# Patient Record
Sex: MALE | Race: WHITE | NOT HISPANIC OR LATINO | Employment: OTHER | URBAN - METROPOLITAN AREA
[De-identification: names, ages, dates, MRNs, and addresses within clinical notes are randomized per-mention and may not be internally consistent; named-entity substitution may affect disease eponyms.]

---

## 2017-07-12 PROBLEM — S72.002A LEFT DISPLACED FEMORAL NECK FRACTURE: Status: ACTIVE | Noted: 2017-07-12

## 2017-07-13 PROBLEM — Z91.89 H/O DIFFICULT INTUBATION: Status: ACTIVE | Noted: 2017-07-13

## 2017-07-13 PROBLEM — E11.9 TYPE 2 DIABETES MELLITUS WITHOUT COMPLICATION: Status: ACTIVE | Noted: 2017-07-13

## 2017-07-13 PROBLEM — I25.810 CORONARY ARTERY DISEASE INVOLVING CORONARY BYPASS GRAFT OF NATIVE HEART: Status: ACTIVE | Noted: 2017-07-13

## 2017-07-13 PROBLEM — J69.0 ASPIRATION PNEUMONIA OF BOTH LOWER LOBES: Status: ACTIVE | Noted: 2017-07-13

## 2017-07-13 PROBLEM — J18.9 PNEUMONIA OF BOTH LOWER LOBES DUE TO INFECTIOUS ORGANISM: Status: ACTIVE | Noted: 2017-07-13

## 2017-07-13 PROBLEM — J96.01 ACUTE HYPOXEMIC RESPIRATORY FAILURE: Status: ACTIVE | Noted: 2017-07-13

## 2017-07-14 PROBLEM — R68.89 INADEQUATE ENTERAL NUTRITION INFUSION: Status: ACTIVE | Noted: 2017-07-14

## 2017-07-16 PROBLEM — J69.0 ASPIRATION PNEUMONIA OF BOTH LOWER LOBES: Status: RESOLVED | Noted: 2017-07-13 | Resolved: 2017-07-16

## 2017-07-16 PROBLEM — J96.01 ACUTE HYPOXEMIC RESPIRATORY FAILURE: Status: RESOLVED | Noted: 2017-07-13 | Resolved: 2017-07-16

## 2017-07-16 PROBLEM — J96.01 ACUTE HYPOXEMIC RESPIRATORY FAILURE: Status: RESOLVED | Noted: 2017-07-16 | Resolved: 2017-07-16

## 2017-07-16 PROBLEM — J69.0 ASPIRATION PNEUMONIA OF BOTH LOWER LOBES: Status: RESOLVED | Noted: 2017-07-16 | Resolved: 2017-07-16

## 2017-07-17 PROBLEM — J69.0 ASPIRATION PNEUMONIA: Status: ACTIVE | Noted: 2017-07-16

## 2017-11-16 PROBLEM — N40.0 BPH (BENIGN PROSTATIC HYPERPLASIA): Status: ACTIVE | Noted: 2017-11-16

## 2017-11-16 PROBLEM — N35.919 URETHRAL STRICTURE: Status: ACTIVE | Noted: 2017-11-16

## 2017-11-16 PROBLEM — R31.29 MICROHEMATURIA: Status: ACTIVE | Noted: 2017-11-16

## 2018-01-04 ENCOUNTER — OFFICE VISIT (OUTPATIENT)
Dept: WOUND CARE | Facility: HOSPITAL | Age: 83
End: 2018-01-04
Attending: SURGERY
Payer: MEDICARE

## 2018-01-04 ENCOUNTER — HOSPITAL ENCOUNTER (OUTPATIENT)
Dept: RADIOLOGY | Facility: HOSPITAL | Age: 83
Discharge: HOME | End: 2018-01-04
Attending: SURGERY
Payer: MEDICARE

## 2018-01-04 VITALS
HEART RATE: 68 BPM | SYSTOLIC BLOOD PRESSURE: 136 MMHG | RESPIRATION RATE: 20 BRPM | DIASTOLIC BLOOD PRESSURE: 68 MMHG | TEMPERATURE: 98 F

## 2018-01-04 DIAGNOSIS — L98.499 ARTERIAL INSUFFICIENCY WITH ISCHEMIC ULCER: ICD-10-CM

## 2018-01-04 DIAGNOSIS — I77.1 ARTERIAL INSUFFICIENCY WITH ISCHEMIC ULCER: ICD-10-CM

## 2018-01-04 DIAGNOSIS — E11.43 TYPE II DIABETES MELLITUS WITH PERIPHERAL AUTONOMIC NEUROPATHY: Primary | ICD-10-CM

## 2018-01-04 LAB
ALBUMIN SERPL BCP-MCNC: 3.2 G/DL
ALP SERPL-CCNC: 141 U/L
ALT SERPL W/O P-5'-P-CCNC: 22 U/L
ANION GAP SERPL CALC-SCNC: 9 MMOL/L
AST SERPL-CCNC: 22 U/L
BASOPHILS # BLD AUTO: 0.01 K/UL
BASOPHILS NFR BLD: 0.1 %
BILIRUB SERPL-MCNC: 0.6 MG/DL
BUN SERPL-MCNC: 19 MG/DL
CALCIUM SERPL-MCNC: 9.7 MG/DL
CHLORIDE SERPL-SCNC: 101 MMOL/L
CO2 SERPL-SCNC: 29 MMOL/L
CREAT SERPL-MCNC: 1.2 MG/DL
DIFFERENTIAL METHOD: ABNORMAL
EOSINOPHIL # BLD AUTO: 0.1 K/UL
EOSINOPHIL NFR BLD: 1.8 %
ERYTHROCYTE [DISTWIDTH] IN BLOOD BY AUTOMATED COUNT: 12.7 %
ERYTHROCYTE [SEDIMENTATION RATE] IN BLOOD BY WESTERGREN METHOD: 64 MM/HR
EST. GFR  (AFRICAN AMERICAN): >60 ML/MIN/1.73 M^2
EST. GFR  (NON AFRICAN AMERICAN): 55 ML/MIN/1.73 M^2
ESTIMATED AVG GLUCOSE: 197 MG/DL
GLUCOSE SERPL-MCNC: 255 MG/DL
HBA1C MFR BLD HPLC: 8.5 %
HCT VFR BLD AUTO: 37.8 %
HGB BLD-MCNC: 12.4 G/DL
LYMPHOCYTES # BLD AUTO: 2 K/UL
LYMPHOCYTES NFR BLD: 26.4 %
MCH RBC QN AUTO: 31.7 PG
MCHC RBC AUTO-ENTMCNC: 32.8 G/DL
MCV RBC AUTO: 97 FL
MONOCYTES # BLD AUTO: 0.6 K/UL
MONOCYTES NFR BLD: 7.2 %
NEUTROPHILS # BLD AUTO: 5 K/UL
NEUTROPHILS NFR BLD: 64.5 %
PLATELET # BLD AUTO: 263 K/UL
PMV BLD AUTO: 9.8 FL
POTASSIUM SERPL-SCNC: 5.1 MMOL/L
PROT SERPL-MCNC: 7.1 G/DL
RBC # BLD AUTO: 3.91 M/UL
SODIUM SERPL-SCNC: 139 MMOL/L
WBC # BLD AUTO: 7.68 K/UL

## 2018-01-04 PROCEDURE — 85651 RBC SED RATE NONAUTOMATED: CPT

## 2018-01-04 PROCEDURE — 80053 COMPREHEN METABOLIC PANEL: CPT

## 2018-01-04 PROCEDURE — 73630 X-RAY EXAM OF FOOT: CPT | Mod: TC,LT

## 2018-01-04 PROCEDURE — 87077 CULTURE AEROBIC IDENTIFY: CPT

## 2018-01-04 PROCEDURE — 11042 DBRDMT SUBQ TIS 1ST 20SQCM/<: CPT

## 2018-01-04 PROCEDURE — 27201912 HC WOUND CARE DEBRIDEMENT SUPPLIES

## 2018-01-04 PROCEDURE — 87176 TISSUE HOMOGENIZATION CULTR: CPT

## 2018-01-04 PROCEDURE — 85025 COMPLETE CBC W/AUTO DIFF WBC: CPT

## 2018-01-04 PROCEDURE — 88305 TISSUE EXAM BY PATHOLOGIST: CPT | Performed by: PATHOLOGY

## 2018-01-04 PROCEDURE — 88305 TISSUE EXAM BY PATHOLOGIST: CPT | Mod: 26,,, | Performed by: PATHOLOGY

## 2018-01-04 PROCEDURE — 87070 CULTURE OTHR SPECIMN AEROBIC: CPT

## 2018-01-04 PROCEDURE — 83036 HEMOGLOBIN GLYCOSYLATED A1C: CPT

## 2018-01-04 PROCEDURE — 87186 SC STD MICRODIL/AGAR DIL: CPT

## 2018-01-04 PROCEDURE — 99212 OFFICE O/P EST SF 10 MIN: CPT | Mod: 25

## 2018-01-04 PROCEDURE — 84134 ASSAY OF PREALBUMIN: CPT

## 2018-01-04 PROCEDURE — 73630 X-RAY EXAM OF FOOT: CPT | Mod: 26,LT,, | Performed by: RADIOLOGY

## 2018-01-04 PROCEDURE — 11044 DBRDMT BONE 1ST 20 SQ CM/<: CPT

## 2018-01-04 PROCEDURE — 88304 TISSUE EXAM BY PATHOLOGIST: CPT | Mod: 26,,, | Performed by: PATHOLOGY

## 2018-01-04 NOTE — PROGRESS NOTES
[facility]  Wound Care  History and Physical    Problem List Items Addressed This Visit     Arterial insufficiency with ischemic ulcer    Overview     HPI: 85-year-old male with a one-month history of ulcers on his left first second third toes as well as a wound on his heel on the left.  Patient has history of coronary artery disease with bypass in the early 90s.  Also has minimal carotid stenosis on ultrasound.  He sees a cardiologist at [facility].  He does not have palpable pulses in his feet.  He will be referred back to [facility] for arterial studies of his extremities as I believe that this is going to be arterial.  These could also be diabetic in nature as he is an insulin-dependent diabetic.    Location: Left first second third toes and heel    Duration: One month    Context: Arterial and or diabetic    Associated Signs & Symptoms: Wounds    Timing:     Severity: None as patient is neuropathic    Quality:     Modifying Factors: Diabetes, hypertension, arterial disease                   History:    Past Medical History:   Diagnosis Date    Acute kidney failure     Anticoagulant long-term use     BPH (benign prostatic hyperplasia)     Cancer     SKIN CANCER, FACIAL MELANOMA    Carotid artery stenosis     Coronary artery disease     Diabetes mellitus     BS      Encounter for blood transfusion     High cholesterol     [place] (hard of hearing)     Hypertension     Microhematuria     Neuropathy     Osteoarthritis     Pneumonia 07/2017    PVCs (premature ventricular contractions)     Shingles        Past Surgical History:   Procedure Laterality Date    APPENDECTOMY      CATARACT EXTRACTION W/ INTRAOCULAR LENS IMPLANT      CHOLECYSTECTOMY      CORONARY ARTERY BYPASS GRAFT      CYSTOSCOPY      MULTI    HERNIA REPAIR      JOINT REPLACEMENT Bilateral     hip    TONSILLECTOMY         Family History   Problem Relation Age of Onset    No Known Problems Mother     Diabetes Father      Heart disease Father         reports that he has never smoked. He has never used smokeless tobacco. He reports that he does not drink alcohol or use drugs.    has a current medication list which includes the following prescription(s): acebutolol, amlodipine, atorvastatin, clopidogrel, finasteride, folic acid/multivit-min/lutein, gabapentin, insulin glargine, and tamsulosin.    Allergies:  Adhesive    Review of Systems:  Review of Systems   Skin:        Ulcers to left first second third toes and heel   All other systems reviewed and are negative.        Vitals:    01/04/18 0824   BP: 136/68   Pulse: 68   Resp: 20   Temp: 97.7 °F (36.5 °C)   TempSrc: Temporal         BMI:  There is no height or weight on file to calculate BMI.    Physical Exam:  Physical Exam   Constitutional: He is oriented to person, place, and time. He appears well-developed and well-nourished.   HENT:   Head: Normocephalic.   Eyes: Pupils are equal, round, and reactive to light.   Neck: Normal range of motion.   Pulmonary/Chest: Effort normal.   Abdominal: Soft.   Musculoskeletal: Normal range of motion.   Neurological: He is alert and oriented to person, place, and time.   Skin:   See wound care progress Notes.  And pictures  Pt with left 2nd toe bone exposed c/w osteomyelitis   Psychiatric: He has a normal mood and affect.       A1C:  No results for input(s): HGBA1C in the last 2160 hours.  BMP:    Recent Labs  Lab Result Units 11/16/17  1204   Glucose mg/dL 257*   Sodium mmol/L 138   Potassium mmol/L 5.3*   Chloride mmol/L 99   CO2 mmol/L 32*   BUN, Bld mg/dL 26*   Creatinine mg/dL 1.00   Calcium mg/dL 9.5      CBC:    Recent Labs  Lab Result Units 11/16/17  1204   WBC K/uL 6.20   RBC M/uL 3.93*   Hemoglobin g/dL 12.7*   Hematocrit % 38.0*   Platelets K/uL 164   MCV fL 97   MCH pg 32.3*   MCHC g/dL 33.4     CMP:    Recent Labs  Lab Result Units 11/16/17  1204   Glucose mg/dL 257*   Calcium mg/dL 9.5   Sodium mmol/L 138   Potassium mmol/L 5.3*    CO2 mmol/L 32*   Chloride mmol/L 99   BUN, Bld mg/dL 26*     PREALBUMIN:  No results for input(s): PREALBUMIN in the last 2160 hours.  WOUND CULTURES:  No results for input(s): LABAERO in the last 2160 hours.        Plan:  See Wound Docs note for plan and follow up.        Rickey Lamas Jr  [facility]

## 2018-01-04 NOTE — PROGRESS NOTES
[facility]  Wound Care  Progress Note    Problem List Items Addressed This Visit     Arterial insufficiency with ischemic ulcer    Overview     HPI: 85-year-old male with a one-month history of ulcers on his left first second third toes as well as a wound on his heel on the left.  Patient has history of coronary artery disease with bypass in the early 90s.  Also has minimal carotid stenosis on ultrasound.  He sees a cardiologist at [facility].  He does not have palpable pulses in his feet.  He will be referred back to [facility] for arterial studies of his extremities as I believe that this is going to be arterial.  These could also be diabetic in nature as he is an insulin-dependent diabetic.    Location: Left first second third toes and heel    Duration: One month    Context: Arterial and or diabetic    Associated Signs & Symptoms: Wounds    Timing:     Severity: None as patient is neuropathic    Quality:     Modifying Factors: Diabetes, hypertension, arterial disease                 See wound doc progress notes. Documents will be scanned.        Rickey Lamas Jr  [facility]

## 2018-01-05 LAB — PREALB SERPL-MCNC: 15 MG/DL

## 2018-01-08 LAB — BACTERIA SPEC AEROBE CULT: NORMAL

## 2018-01-11 ENCOUNTER — OFFICE VISIT (OUTPATIENT)
Dept: WOUND CARE | Facility: HOSPITAL | Age: 83
End: 2018-01-11
Attending: SURGERY
Payer: MEDICARE

## 2018-01-11 VITALS
SYSTOLIC BLOOD PRESSURE: 114 MMHG | TEMPERATURE: 97 F | DIASTOLIC BLOOD PRESSURE: 60 MMHG | HEART RATE: 65 BPM | RESPIRATION RATE: 20 BRPM

## 2018-01-11 DIAGNOSIS — E11.621 DIABETIC ULCER OF TOE OF LEFT FOOT ASSOCIATED WITH TYPE 2 DIABETES MELLITUS, WITH FAT LAYER EXPOSED: ICD-10-CM

## 2018-01-11 DIAGNOSIS — E11.43 TYPE II DIABETES MELLITUS WITH PERIPHERAL AUTONOMIC NEUROPATHY: Primary | ICD-10-CM

## 2018-01-11 DIAGNOSIS — L97.524 DIABETIC ULCER OF TOE OF LEFT FOOT ASSOCIATED WITH TYPE 2 DIABETES MELLITUS, WITH NECROSIS OF BONE: ICD-10-CM

## 2018-01-11 DIAGNOSIS — E11.621 DIABETIC ULCER OF TOE OF LEFT FOOT ASSOCIATED WITH TYPE 2 DIABETES MELLITUS, WITH NECROSIS OF BONE: ICD-10-CM

## 2018-01-11 DIAGNOSIS — L97.429 DIABETIC ULCER OF LEFT HEEL ASSOCIATED WITH TYPE 2 DIABETES MELLITUS, UNSPECIFIED ULCER STAGE: ICD-10-CM

## 2018-01-11 DIAGNOSIS — L97.522 DIABETIC ULCER OF TOE OF LEFT FOOT ASSOCIATED WITH TYPE 2 DIABETES MELLITUS, WITH FAT LAYER EXPOSED: ICD-10-CM

## 2018-01-11 DIAGNOSIS — E11.621 DIABETIC ULCER OF LEFT HEEL ASSOCIATED WITH TYPE 2 DIABETES MELLITUS, UNSPECIFIED ULCER STAGE: ICD-10-CM

## 2018-01-11 PROCEDURE — 27201912 HC WOUND CARE DEBRIDEMENT SUPPLIES

## 2018-01-11 PROCEDURE — 11042 DBRDMT SUBQ TIS 1ST 20SQCM/<: CPT

## 2018-01-11 PROCEDURE — 11044 DBRDMT BONE 1ST 20 SQ CM/<: CPT

## 2018-01-11 NOTE — PROGRESS NOTES
[facility]  Wound Care  Progress Note    Problem List Items Addressed This Visit     Arterial insufficiency with ischemic ulcer    Overview     HPI: 85-year-old male with a one-month history of ulcers on his left first second third toes as well as a wound on his heel on the left.  Patient has history of coronary artery disease with bypass in the early 90s.  Also has minimal carotid stenosis on ultrasound.  He sees a cardiologist at [facility].  He does not have palpable pulses in his feet.  He will be referred back to [facility] for arterial studies of his extremities as I believe that this is going to be arterial.  These could also be diabetic in nature as he is an insulin-dependent diabetic. Seems to be more diabetic as CIS results show minimal vascular issues     Location: Left first second third toes and heel    Duration: One month    Context: Arterial and or diabetic    Associated Signs & Symptoms: Wounds    Timing:     Severity: None as patient is neuropathic    Quality:     Modifying Factors: Diabetes, hypertension, arterial disease                 See wound doc progress notes. Documents will be scanned.        Lorenzo Carranza  [facility]

## 2018-01-12 PROBLEM — R55 SYNCOPE: Status: ACTIVE | Noted: 2018-01-12

## 2018-01-12 PROBLEM — G62.9 PERIPHERAL NEUROPATHY: Status: ACTIVE | Noted: 2018-01-12

## 2018-01-14 PROBLEM — Z79.4 TYPE 2 DIABETES MELLITUS WITHOUT COMPLICATION, WITH LONG-TERM CURRENT USE OF INSULIN: Status: ACTIVE | Noted: 2017-07-13

## 2018-01-15 PROBLEM — L97.519 ULCER OF RIGHT FOOT: Status: ACTIVE | Noted: 2018-01-15

## 2018-01-25 ENCOUNTER — OFFICE VISIT (OUTPATIENT)
Dept: WOUND CARE | Facility: HOSPITAL | Age: 83
End: 2018-01-25
Attending: SURGERY
Payer: MEDICARE

## 2018-01-25 VITALS — HEART RATE: 57 BPM | SYSTOLIC BLOOD PRESSURE: 138 MMHG | RESPIRATION RATE: 18 BRPM | DIASTOLIC BLOOD PRESSURE: 70 MMHG

## 2018-01-25 DIAGNOSIS — I77.1 ARTERIAL INSUFFICIENCY WITH ISCHEMIC ULCER: ICD-10-CM

## 2018-01-25 DIAGNOSIS — L97.429 DIABETIC ULCER OF LEFT HEEL ASSOCIATED WITH TYPE 2 DIABETES MELLITUS, UNSPECIFIED ULCER STAGE: Primary | ICD-10-CM

## 2018-01-25 DIAGNOSIS — E11.621 DIABETIC ULCER OF TOE OF LEFT FOOT ASSOCIATED WITH TYPE 2 DIABETES MELLITUS, WITH NECROSIS OF BONE: ICD-10-CM

## 2018-01-25 DIAGNOSIS — E11.621 DIABETIC ULCER OF LEFT HEEL ASSOCIATED WITH TYPE 2 DIABETES MELLITUS, UNSPECIFIED ULCER STAGE: Primary | ICD-10-CM

## 2018-01-25 DIAGNOSIS — L98.499 ARTERIAL INSUFFICIENCY WITH ISCHEMIC ULCER: ICD-10-CM

## 2018-01-25 DIAGNOSIS — L97.522 DIABETIC ULCER OF TOE OF LEFT FOOT ASSOCIATED WITH TYPE 2 DIABETES MELLITUS, WITH FAT LAYER EXPOSED: ICD-10-CM

## 2018-01-25 DIAGNOSIS — E11.621 DIABETIC ULCER OF TOE OF LEFT FOOT ASSOCIATED WITH TYPE 2 DIABETES MELLITUS, WITH FAT LAYER EXPOSED: ICD-10-CM

## 2018-01-25 DIAGNOSIS — L97.524 DIABETIC ULCER OF TOE OF LEFT FOOT ASSOCIATED WITH TYPE 2 DIABETES MELLITUS, WITH NECROSIS OF BONE: ICD-10-CM

## 2018-01-25 PROCEDURE — 11042 DBRDMT SUBQ TIS 1ST 20SQCM/<: CPT

## 2018-01-25 PROCEDURE — 27201912 HC WOUND CARE DEBRIDEMENT SUPPLIES

## 2018-02-01 ENCOUNTER — OFFICE VISIT (OUTPATIENT)
Dept: WOUND CARE | Facility: HOSPITAL | Age: 83
End: 2018-02-01
Attending: SURGERY
Payer: MEDICARE

## 2018-02-01 VITALS
TEMPERATURE: 98 F | DIASTOLIC BLOOD PRESSURE: 67 MMHG | RESPIRATION RATE: 18 BRPM | HEART RATE: 60 BPM | SYSTOLIC BLOOD PRESSURE: 129 MMHG

## 2018-02-01 DIAGNOSIS — E11.621 DIABETIC ULCER OF LEFT HEEL ASSOCIATED WITH TYPE 2 DIABETES MELLITUS, UNSPECIFIED ULCER STAGE: Primary | ICD-10-CM

## 2018-02-01 DIAGNOSIS — L97.522 DIABETIC ULCER OF TOE OF LEFT FOOT ASSOCIATED WITH TYPE 2 DIABETES MELLITUS, WITH FAT LAYER EXPOSED: ICD-10-CM

## 2018-02-01 DIAGNOSIS — E11.621 DIABETIC ULCER OF TOE OF LEFT FOOT ASSOCIATED WITH TYPE 2 DIABETES MELLITUS, WITH FAT LAYER EXPOSED: ICD-10-CM

## 2018-02-01 DIAGNOSIS — I77.1 ARTERIAL INSUFFICIENCY WITH ISCHEMIC ULCER: ICD-10-CM

## 2018-02-01 DIAGNOSIS — L98.499 ARTERIAL INSUFFICIENCY WITH ISCHEMIC ULCER: ICD-10-CM

## 2018-02-01 DIAGNOSIS — E11.621 DIABETIC ULCER OF TOE OF LEFT FOOT ASSOCIATED WITH TYPE 2 DIABETES MELLITUS, WITH NECROSIS OF BONE: ICD-10-CM

## 2018-02-01 DIAGNOSIS — L97.524 DIABETIC ULCER OF TOE OF LEFT FOOT ASSOCIATED WITH TYPE 2 DIABETES MELLITUS, WITH NECROSIS OF BONE: ICD-10-CM

## 2018-02-01 DIAGNOSIS — L97.429 DIABETIC ULCER OF LEFT HEEL ASSOCIATED WITH TYPE 2 DIABETES MELLITUS, UNSPECIFIED ULCER STAGE: Primary | ICD-10-CM

## 2018-02-01 PROCEDURE — 11042 DBRDMT SUBQ TIS 1ST 20SQCM/<: CPT

## 2018-02-01 PROCEDURE — 27201912 HC WOUND CARE DEBRIDEMENT SUPPLIES

## 2018-02-01 NOTE — ASSESSMENT & PLAN NOTE
Large toe nearly resolved. Heel much better. 2nd toe clean but still with depth. Overall improving.

## 2018-02-01 NOTE — PROGRESS NOTES
[facility]  Wound Care  Progress Note    Problem List Items Addressed This Visit     Arterial insufficiency with ischemic ulcer    Overview     HPI: 85-year-old male with a one-month history of ulcers on his left first second third toes as well as a wound on his heel on the left.  Patient has history of coronary artery disease with bypass in the early 90s.  Also has minimal carotid stenosis on ultrasound.  He sees a cardiologist at [facility].  He does not have palpable pulses in his feet.  He will be referred back to [facility] for arterial studies of his extremities as I believe that this is going to be arterial.  These could also be diabetic in nature as he is an insulin-dependent diabetic. Seems to be more diabetic as CIS results show minimal vascular issues     Location: Left first second third toes and heel    Duration: One month    Context: Arterial and or diabetic    Associated Signs & Symptoms: Wounds    Timing:     Severity: None as patient is neuropathic    Quality:     Modifying Factors: Diabetes, hypertension, arterial disease           Current Assessment & Plan     Large toe nearly resolved. Heel much better. 2nd toe clean but still with depth. Overall improving.               See wound doc progress notes. Documents will be scanned.        Lorenzo Carranza  [facility]

## 2018-02-22 ENCOUNTER — OFFICE VISIT (OUTPATIENT)
Dept: WOUND CARE | Facility: HOSPITAL | Age: 83
End: 2018-02-22
Attending: SURGERY
Payer: MEDICARE

## 2018-02-22 VITALS
SYSTOLIC BLOOD PRESSURE: 122 MMHG | HEART RATE: 89 BPM | RESPIRATION RATE: 18 BRPM | TEMPERATURE: 98 F | DIASTOLIC BLOOD PRESSURE: 72 MMHG

## 2018-02-22 DIAGNOSIS — I77.1 ARTERIAL INSUFFICIENCY WITH ISCHEMIC ULCER: ICD-10-CM

## 2018-02-22 DIAGNOSIS — L98.499 ARTERIAL INSUFFICIENCY WITH ISCHEMIC ULCER: ICD-10-CM

## 2018-02-22 PROCEDURE — 11042 DBRDMT SUBQ TIS 1ST 20SQCM/<: CPT

## 2018-02-22 PROCEDURE — 27201912 HC WOUND CARE DEBRIDEMENT SUPPLIES

## 2018-02-22 NOTE — PROGRESS NOTES
[facility]  Wound Care  Progress Note    Problem List Items Addressed This Visit     Arterial insufficiency with ischemic ulcer    Overview     HPI: 85-year-old male with a one-month history of ulcers on his left first second third toes as well as a wound on his heel on the left.  Patient has history of coronary artery disease with bypass in the early 90s.  Also has minimal carotid stenosis on ultrasound.  He sees a cardiologist at [facility] [place].  He does not have palpable pulses in his feet.  He will be referred back to [facility] for arterial studies of his extremities as I believe that this is going to be arterial.  These could also be diabetic in nature as he is an insulin-dependent diabetic. Seems to be more diabetic as CIS results show minimal vascular issues.  His heel and third toe now are healed.     Location: Left first second third toes and heel    Duration: One month    Context: Arterial and or diabetic    Associated Signs & Symptoms: Wounds    Timing:     Severity: None as patient is neuropathic    Quality:     Modifying Factors: Diabetes, hypertension, arterial disease                 See wound doc progress notes. Documents will be scanned.        Rickey Lamas Jr  [facility]

## 2018-03-01 ENCOUNTER — OFFICE VISIT (OUTPATIENT)
Dept: WOUND CARE | Facility: HOSPITAL | Age: 83
End: 2018-03-01
Attending: SURGERY
Payer: MEDICARE

## 2018-03-01 VITALS
SYSTOLIC BLOOD PRESSURE: 145 MMHG | RESPIRATION RATE: 18 BRPM | TEMPERATURE: 98 F | HEART RATE: 82 BPM | DIASTOLIC BLOOD PRESSURE: 75 MMHG

## 2018-03-01 DIAGNOSIS — I77.1 ARTERIAL INSUFFICIENCY WITH ISCHEMIC ULCER: Primary | ICD-10-CM

## 2018-03-01 DIAGNOSIS — L98.499 ARTERIAL INSUFFICIENCY WITH ISCHEMIC ULCER: Primary | ICD-10-CM

## 2018-03-01 PROCEDURE — 27201912 HC WOUND CARE DEBRIDEMENT SUPPLIES

## 2018-03-01 PROCEDURE — 11042 DBRDMT SUBQ TIS 1ST 20SQCM/<: CPT

## 2018-03-01 NOTE — PROGRESS NOTES
[facility]  Wound Care  Progress Note    Problem List Items Addressed This Visit     Arterial insufficiency with ischemic ulcer - Primary    Overview     HPI: 85-year-old male with a one-month history of ulcers on his left first second third toes as well as a wound on his heel on the left.  Patient has history of coronary artery disease with bypass in the early 90s.  Also has minimal carotid stenosis on ultrasound.  He sees a cardiologist at [facility].  He does not have palpable pulses in his feet.  He will be referred back to [facility] for arterial studies of his extremities as I believe that this is going to be arterial.  These could also be diabetic in nature as he is an insulin-dependent diabetic. Seems to be more diabetic as CIS results show minimal vascular issues.  His heel and third toe now are healed.     Location: Left first second third toes and heel    Duration: One month    Context: Arterial and or diabetic    Associated Signs & Symptoms: Wounds    Timing:     Severity: None as patient is neuropathic    Quality:     Modifying Factors: Diabetes, hypertension, arterial disease                 See wound doc progress notes. Documents will be scanned.        Rickey Lamas Jr  [facility]

## 2018-03-08 ENCOUNTER — OFFICE VISIT (OUTPATIENT)
Dept: WOUND CARE | Facility: HOSPITAL | Age: 83
End: 2018-03-08
Attending: SURGERY
Payer: MEDICARE

## 2018-03-08 VITALS
HEART RATE: 67 BPM | RESPIRATION RATE: 18 BRPM | SYSTOLIC BLOOD PRESSURE: 153 MMHG | TEMPERATURE: 98 F | DIASTOLIC BLOOD PRESSURE: 70 MMHG

## 2018-03-08 DIAGNOSIS — E11.621 DIABETIC ULCER OF LEFT HEEL ASSOCIATED WITH TYPE 2 DIABETES MELLITUS, UNSPECIFIED ULCER STAGE: Primary | ICD-10-CM

## 2018-03-08 DIAGNOSIS — L98.499 ARTERIAL INSUFFICIENCY WITH ISCHEMIC ULCER: ICD-10-CM

## 2018-03-08 DIAGNOSIS — L97.429 DIABETIC ULCER OF LEFT HEEL ASSOCIATED WITH TYPE 2 DIABETES MELLITUS, UNSPECIFIED ULCER STAGE: Primary | ICD-10-CM

## 2018-03-08 DIAGNOSIS — I77.1 ARTERIAL INSUFFICIENCY WITH ISCHEMIC ULCER: ICD-10-CM

## 2018-03-08 PROCEDURE — 27201912 HC WOUND CARE DEBRIDEMENT SUPPLIES

## 2018-03-08 PROCEDURE — 11042 DBRDMT SUBQ TIS 1ST 20SQCM/<: CPT

## 2018-03-08 NOTE — PROGRESS NOTES
[facility]  Wound Care  Progress Note    Problem List Items Addressed This Visit     Arterial insufficiency with ischemic ulcer    Overview     HPI: 85-year-old male with a one-month history of ulcers on his left first second third toes as well as a wound on his heel on the left.  Patient has history of coronary artery disease with bypass in the early 90s.  Also has minimal carotid stenosis on ultrasound.  He sees a cardiologist at [facility].  He does not have palpable pulses in his feet.  He will be referred back to [facility] for arterial studies of his extremities as I believe that this is going to be arterial.  These could also be diabetic in nature as he is an insulin-dependent diabetic. Seems to be more diabetic as CIS results show minimal vascular issues.  His heel and third toe now are healed.     Location: Left first second third toes and heel    Duration: One month    Context: Arterial and or diabetic    Associated Signs & Symptoms: Wounds    Timing:     Severity: None as patient is neuropathic    Quality:     Modifying Factors: Diabetes, hypertension, arterial disease           Current Assessment & Plan     Wounds on toes mostly resolved. Heel is smaller. Will try promogram.                See wound doc progress notes. Documents will be scanned.        Lorenzo Carranza  [facility]

## 2018-03-22 ENCOUNTER — OFFICE VISIT (OUTPATIENT)
Dept: WOUND CARE | Facility: HOSPITAL | Age: 83
End: 2018-03-22
Attending: SURGERY
Payer: MEDICARE

## 2018-03-22 VITALS
SYSTOLIC BLOOD PRESSURE: 150 MMHG | RESPIRATION RATE: 18 BRPM | DIASTOLIC BLOOD PRESSURE: 79 MMHG | HEART RATE: 74 BPM | TEMPERATURE: 98 F

## 2018-03-22 DIAGNOSIS — E11.621 DIABETIC ULCER OF LEFT HEEL ASSOCIATED WITH TYPE 2 DIABETES MELLITUS, UNSPECIFIED ULCER STAGE: Primary | ICD-10-CM

## 2018-03-22 DIAGNOSIS — L97.429 DIABETIC ULCER OF LEFT HEEL ASSOCIATED WITH TYPE 2 DIABETES MELLITUS, UNSPECIFIED ULCER STAGE: Primary | ICD-10-CM

## 2018-03-22 DIAGNOSIS — L98.499 ARTERIAL INSUFFICIENCY WITH ISCHEMIC ULCER: ICD-10-CM

## 2018-03-22 DIAGNOSIS — I77.1 ARTERIAL INSUFFICIENCY WITH ISCHEMIC ULCER: ICD-10-CM

## 2018-03-22 PROCEDURE — 99212 OFFICE O/P EST SF 10 MIN: CPT

## 2018-03-22 NOTE — PROGRESS NOTES
[facility]  Wound Care  Progress Note    Problem List Items Addressed This Visit     Arterial insufficiency with ischemic ulcer    Overview     HPI: 85-year-old male with a one-month history of ulcers on his left first second third toes as well as a wound on his heel on the left.  Patient has history of coronary artery disease with bypass in the early 90s.  Also has minimal carotid stenosis on ultrasound.  He sees a cardiologist at [facility].  He does not have palpable pulses in his feet.  He will be referred back to [facility] for arterial studies of his extremities as I believe that this is going to be arterial.  These could also be diabetic in nature as he is an insulin-dependent diabetic. Seems to be more diabetic as CIS results show minimal vascular issues.  His heel and  Toes now are healed.     Location: Left first second third toes and heel    Duration: One month    Context: Arterial and or diabetic    Associated Signs & Symptoms: Wounds    Timing:     Severity: None as patient is neuropathic    Quality:     Modifying Factors: Diabetes, hypertension, arterial disease           Current Assessment & Plan     All wounds resolved today.            Other Visit Diagnoses     Diabetic ulcer of left heel associated with type 2 diabetes mellitus, unspecified ulcer stage    -  Primary          See wound doc progress notes. Documents will be scanned.        Lorenzo Carranza  [facility]

## 2019-04-03 PROBLEM — R31.29 MICROSCOPIC HEMATURIA: Status: ACTIVE | Noted: 2019-04-03

## 2020-06-15 ENCOUNTER — APPOINTMENT (RX ONLY)
Dept: URBAN - METROPOLITAN AREA OTHER 11 | Facility: OTHER | Age: 85
Setting detail: DERMATOLOGY
End: 2020-06-15

## 2020-06-15 DIAGNOSIS — L82.1 OTHER SEBORRHEIC KERATOSIS: ICD-10-CM

## 2020-06-15 DIAGNOSIS — L90.5 SCAR CONDITIONS AND FIBROSIS OF SKIN: ICD-10-CM

## 2020-06-15 DIAGNOSIS — L57.0 ACTINIC KERATOSIS: ICD-10-CM

## 2020-06-15 PROCEDURE — ? COUNSELING

## 2020-06-15 PROCEDURE — 99202 OFFICE O/P NEW SF 15 MIN: CPT | Mod: 25

## 2020-06-15 PROCEDURE — ? LIQUID NITROGEN

## 2020-06-15 PROCEDURE — 17000 DESTRUCT PREMALG LESION: CPT

## 2020-06-15 PROCEDURE — 17003 DESTRUCT PREMALG LES 2-14: CPT

## 2020-06-15 ASSESSMENT — LOCATION DETAILED DESCRIPTION DERM
LOCATION DETAILED: LEFT LATERAL ZYGOMA
LOCATION DETAILED: RIGHT LATERAL MALAR CHEEK
LOCATION DETAILED: LEFT LATERAL MANDIBULAR CHEEK
LOCATION DETAILED: RIGHT LATERAL ZYGOMA
LOCATION DETAILED: RIGHT NASAL DORSUM
LOCATION DETAILED: NASAL DORSUM
LOCATION DETAILED: RIGHT SUPERIOR PREAURICULAR CHEEK
LOCATION DETAILED: NASAL SUPRATIP

## 2020-06-15 ASSESSMENT — LOCATION SIMPLE DESCRIPTION DERM
LOCATION SIMPLE: RIGHT ZYGOMA
LOCATION SIMPLE: LEFT ZYGOMA
LOCATION SIMPLE: LEFT CHEEK
LOCATION SIMPLE: NOSE
LOCATION SIMPLE: RIGHT CHEEK

## 2020-06-15 ASSESSMENT — LOCATION ZONE DERM
LOCATION ZONE: FACE
LOCATION ZONE: NOSE

## 2020-06-15 NOTE — PROCEDURE: LIQUID NITROGEN
Duration Of Freeze Thaw-Cycle (Seconds): 6
Consent: The patient's consent was obtained including but not limited to risks of crusting, scabbing, blistering, scarring, darker or lighter pigmentary change, recurrence, incomplete removal and infection.
Aperture Size (Optional): C
Detail Level: Detailed
Render Note In Bullet Format When Appropriate: No
Number Of Freeze-Thaw Cycles: 2 freeze-thaw cycles
Post-Care Instructions: I reviewed with the patient in detail post-care instructions. Patient is to wear sunprotection, and avoid picking at any of the treated lesions. Pt may apply Vaseline to crusted or scabbing areas.

## 2020-06-15 NOTE — HPI: SKIN LESION
Is This A New Presentation, Or A Follow-Up?: Skin Lesion
What Type Of Note Output Would You Prefer (Optional)?: Standard Output
Has Your Skin Lesion Been Treated?: not been treated
23-May-2018

## 2024-01-06 NOTE — PROGRESS NOTES
[facility]  Wound Care  Progress Note    Problem List Items Addressed This Visit     Arterial insufficiency with ischemic ulcer    Overview     HPI: 85-year-old male with a one-month history of ulcers on his left first second third toes as well as a wound on his heel on the left.  Patient has history of coronary artery disease with bypass in the early 90s.  Also has minimal carotid stenosis on ultrasound.  He sees a cardiologist at [facility].  He does not have palpable pulses in his feet.  He will be referred back to [facility] for arterial studies of his extremities as I believe that this is going to be arterial.  These could also be diabetic in nature as he is an insulin-dependent diabetic. Seems to be more diabetic as CIS results show minimal vascular issues     Location: Left first second third toes and heel    Duration: One month    Context: Arterial and or diabetic    Associated Signs & Symptoms: Wounds    Timing:     Severity: None as patient is neuropathic    Quality:     Modifying Factors: Diabetes, hypertension, arterial disease           Current Assessment & Plan     Ulcers improving. Toe ulcers are better after removing old thickened nails today               See wound doc progress notes. Documents will be scanned.        Lorenzo Carranza  [facility]  
See wound care assessment documentation in chart review. Scanned under media tab.     
moderate assist (50% patients effort)